# Patient Record
Sex: FEMALE | Race: WHITE | Employment: UNEMPLOYED | URBAN - METROPOLITAN AREA
[De-identification: names, ages, dates, MRNs, and addresses within clinical notes are randomized per-mention and may not be internally consistent; named-entity substitution may affect disease eponyms.]

---
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
## 2024-11-12 ENCOUNTER — HOSPITAL ENCOUNTER (OUTPATIENT)
Age: 19
Setting detail: SPECIMEN
Discharge: HOME | End: 2024-11-12
Payer: COMMERCIAL

## 2024-11-12 ENCOUNTER — INITIAL PRENATAL (OUTPATIENT)
Dept: OBGYN | Age: 19
End: 2024-11-12
Payer: COMMERCIAL

## 2024-11-12 VITALS — BODY MASS INDEX: 21.46 KG/M2 | DIASTOLIC BLOOD PRESSURE: 73 MMHG | SYSTOLIC BLOOD PRESSURE: 107 MMHG | WEIGHT: 125 LBS

## 2024-11-12 DIAGNOSIS — Z3A.17 17 WEEKS GESTATION OF PREGNANCY: Primary | ICD-10-CM

## 2024-11-12 DIAGNOSIS — O09.30 LATE PRENATAL CARE: ICD-10-CM

## 2024-11-12 DIAGNOSIS — Z34.02 ENCOUNTER FOR SUPERVISION OF NORMAL PRIMIGRAVIDA IN SECOND TRIMESTER, ANTEPARTUM: ICD-10-CM

## 2024-11-12 PROCEDURE — 87491 CHLMYD TRACH DNA AMP PROBE: CPT

## 2024-11-12 PROCEDURE — 99213 OFFICE O/P EST LOW 20 MIN: CPT | Performed by: NURSE PRACTITIONER

## 2024-11-12 PROCEDURE — G8427 DOCREV CUR MEDS BY ELIG CLIN: HCPCS | Performed by: NURSE PRACTITIONER

## 2024-11-12 PROCEDURE — G0123 SCREEN CERV/VAG THIN LAYER: HCPCS

## 2024-11-12 PROCEDURE — 36415 COLL VENOUS BLD VENIPUNCTURE: CPT | Performed by: NURSE PRACTITIONER

## 2024-11-12 PROCEDURE — G8420 CALC BMI NORM PARAMETERS: HCPCS | Performed by: NURSE PRACTITIONER

## 2024-11-12 PROCEDURE — 4004F PT TOBACCO SCREEN RCVD TLK: CPT | Performed by: NURSE PRACTITIONER

## 2024-11-12 PROCEDURE — G8484 FLU IMMUNIZE NO ADMIN: HCPCS | Performed by: NURSE PRACTITIONER

## 2024-11-12 PROCEDURE — 87591 N.GONORRHOEAE DNA AMP PROB: CPT

## 2024-11-12 ASSESSMENT — ENCOUNTER SYMPTOMS
DIARRHEA: 0
NAUSEA: 0
CONSTIPATION: 0
RESPIRATORY NEGATIVE: 1
ABDOMINAL PAIN: 0
VOMITING: 0
GASTROINTESTINAL NEGATIVE: 1
SHORTNESS OF BREATH: 0

## 2024-11-12 NOTE — PROGRESS NOTES
Memory: Cognition normal.         Judgment: Judgment normal.         No results found for this visit on 11/12/24.    ASSESSMENT AND PLAN    Prenatal info given, hx and poc reviewed. Pap, cultures, labs,  Panorama and Carrier Screens collected.   Pt to continue PNV QD, Tylenol sparingly/prn.   Bldg/pain precautions reviewed.   Anatomy u/s scheduled.   Fu vaccine declined        No follow-ups on file.    Tanesha Cabezas, APRN - CNP

## 2024-11-13 LAB
ABO + RH BLD: NORMAL
BASOPHILS # BLD: 0 K/UL (ref 0–0.2)
BASOPHILS NFR BLD: 0.3 %
BLD GP AB SCN SERPL QL: NORMAL
DEPRECATED RDW RBC AUTO: 12.9 % (ref 12.4–15.4)
EOSINOPHIL # BLD: 0.3 K/UL (ref 0–0.6)
EOSINOPHIL NFR BLD: 2.8 %
HBV SURFACE AG SERPL QL IA: ABNORMAL
HCT VFR BLD AUTO: 38.1 % (ref 36–48)
HGB BLD-MCNC: 12.9 G/DL (ref 12–16)
HIV 1+2 AB+HIV1 P24 AG SERPL QL IA: NORMAL
HIV 2 AB SERPL QL IA: NORMAL
HIV1 AB SERPL QL IA: NORMAL
HIV1 P24 AG SERPL QL IA: NORMAL
LYMPHOCYTES # BLD: 1.8 K/UL (ref 1–5.1)
LYMPHOCYTES NFR BLD: 15.7 %
MCH RBC QN AUTO: 32.5 PG (ref 26–34)
MCHC RBC AUTO-ENTMCNC: 33.9 G/DL (ref 31–36)
MCV RBC AUTO: 95.9 FL (ref 80–100)
MONOCYTES # BLD: 0.5 K/UL (ref 0–1.3)
MONOCYTES NFR BLD: 4.5 %
NEUTROPHILS # BLD: 9 K/UL (ref 1.7–7.7)
NEUTROPHILS NFR BLD: 76.7 %
PLATELET # BLD AUTO: 341 K/UL (ref 135–450)
PMV BLD AUTO: 8 FL (ref 5–10.5)
RBC # BLD AUTO: 3.97 M/UL (ref 4–5.2)
REAGIN+T PALLIDUM IGG+IGM SERPL-IMP: ABNORMAL
RUBV IGG SERPL IA-ACNC: 61.7 IU/ML
WBC # BLD AUTO: 11.8 K/UL (ref 4–11)

## 2024-11-14 LAB
BACTERIA UR CULT: NORMAL
C TRACH SPEC QL CULT: NEGATIVE
HCV RNA SERPL NAA+PROBE-ACNC: NOT DETECTED IU/ML
HCV RNA SERPL NAA+PROBE-LOG IU: NOT DETECTED LOG IU/ML
HCV RNA SERPL QL NAA+PROBE: NOT DETECTED
NEISSERIA GONORRHOEAE, CULTURE: NEGATIVE

## 2024-11-15 LAB
COMMENT: NORMAL
HPV OTHER HR TYPES: NORMAL
HPV TYPE 16: NORMAL
HPV TYPE 18: NORMAL

## 2024-11-18 LAB
GYNECOLOGY CYTOLOGY REPORT: NORMAL
Lab: NORMAL
NTRA 22Q11.2 DELETION SYNDROME POPULATION-BASED RISK TEXT: NORMAL
NTRA 22Q11.2 DELETION SYNDROME RESULT TEXT: NORMAL
NTRA 22Q11.2 DELETION SYNDROME RISK SCORE TEXT: NORMAL
NTRA FETAL FRACTION: NORMAL
NTRA FETAL RHD SUMMARY: NORMAL
NTRA GENDER OF FETUS: NORMAL
NTRA MONOSOMY X AGE-BASED RISK TEXT: NORMAL
NTRA MONOSOMY X RESULT TEXT: NORMAL
NTRA MONOSOMY X RISK SCORE TEXT: NORMAL
NTRA TRIPLOIDY RESULT TEXT: NORMAL
NTRA TRISOMY 13 AGE-BASED RISK TEXT: NORMAL
NTRA TRISOMY 13 RESULT TEXT: NORMAL
NTRA TRISOMY 13 RISK SCORE TEXT: NORMAL
NTRA TRISOMY 18 AGE-BASED RISK TEXT: NORMAL
NTRA TRISOMY 18 RESULT TEXT: NORMAL
NTRA TRISOMY 18 RISK SCORE TEXT: NORMAL
NTRA TRISOMY 21 AGE-BASED RISK TEXT: NORMAL
NTRA TRISOMY 21 RESULT TEXT: NORMAL
NTRA TRISOMY 21 RISK SCORE TEXT: NORMAL

## 2024-11-19 LAB
COMMENT: ABNORMAL
HPV OTHER HR TYPES: DETECTED
HPV TYPE 16: NOT DETECTED
HPV TYPE 18: NOT DETECTED

## 2024-11-20 LAB
Lab: NEGATIVE
Lab: NORMAL
NTRA ALPHA-THALASSEMIA: NEGATIVE
NTRA BETA-HEMOGLOBINOPATHIES: NEGATIVE
NTRA CANAVAN DISEASE: NEGATIVE
NTRA CYSTIC FIBROSIS: NEGATIVE
NTRA DUCHENNE/BECKER MUSCULAR DYSTROPHY: NEGATIVE
NTRA FAMILIAL DYSAUTONOMIA: NEGATIVE
NTRA FRAGILE X SYNDROME: NEGATIVE
NTRA GALACTOSEMIA: NEGATIVE
NTRA GAUCHER DISEASE: NEGATIVE
NTRA MEDIUM CHAIN ACYL-COA DEHYDROGENASE DEFICIENCY: NEGATIVE
NTRA POLYCYSTIC KIDNEY DISEASE, AUTOSOMAL RECESSIVE: NEGATIVE
NTRA SMITH-LEMLI-OPITZ SYNDROME: NEGATIVE
NTRA SPINAL MUSCULAR ATROPHY: NEGATIVE
NTRA TAY-SACHS DISEASE: NEGATIVE

## 2024-12-10 ENCOUNTER — ROUTINE PRENATAL (OUTPATIENT)
Dept: OBGYN | Age: 19
End: 2024-12-10
Payer: COMMERCIAL

## 2024-12-10 VITALS
HEIGHT: 64 IN | BODY MASS INDEX: 23.05 KG/M2 | WEIGHT: 135 LBS | DIASTOLIC BLOOD PRESSURE: 63 MMHG | SYSTOLIC BLOOD PRESSURE: 97 MMHG

## 2024-12-10 DIAGNOSIS — Z3A.21 21 WEEKS GESTATION OF PREGNANCY: Primary | ICD-10-CM

## 2024-12-10 DIAGNOSIS — O09.30 LATE PRENATAL CARE: ICD-10-CM

## 2024-12-10 DIAGNOSIS — Z34.02 ENCOUNTER FOR SUPERVISION OF NORMAL PRIMIGRAVIDA IN SECOND TRIMESTER, ANTEPARTUM: ICD-10-CM

## 2024-12-10 PROCEDURE — 99213 OFFICE O/P EST LOW 20 MIN: CPT | Performed by: NURSE PRACTITIONER

## 2024-12-10 PROCEDURE — G8484 FLU IMMUNIZE NO ADMIN: HCPCS | Performed by: NURSE PRACTITIONER

## 2024-12-10 PROCEDURE — 4004F PT TOBACCO SCREEN RCVD TLK: CPT | Performed by: NURSE PRACTITIONER

## 2024-12-10 PROCEDURE — G8427 DOCREV CUR MEDS BY ELIG CLIN: HCPCS | Performed by: NURSE PRACTITIONER

## 2024-12-10 PROCEDURE — G8420 CALC BMI NORM PARAMETERS: HCPCS | Performed by: NURSE PRACTITIONER

## 2024-12-10 SDOH — ECONOMIC STABILITY: FOOD INSECURITY: WITHIN THE PAST 12 MONTHS, YOU WORRIED THAT YOUR FOOD WOULD RUN OUT BEFORE YOU GOT MONEY TO BUY MORE.: NEVER TRUE

## 2024-12-10 SDOH — ECONOMIC STABILITY: INCOME INSECURITY: HOW HARD IS IT FOR YOU TO PAY FOR THE VERY BASICS LIKE FOOD, HOUSING, MEDICAL CARE, AND HEATING?: NOT HARD AT ALL

## 2024-12-10 SDOH — ECONOMIC STABILITY: FOOD INSECURITY: WITHIN THE PAST 12 MONTHS, THE FOOD YOU BOUGHT JUST DIDN'T LAST AND YOU DIDN'T HAVE MONEY TO GET MORE.: NEVER TRUE

## 2024-12-10 ASSESSMENT — PATIENT HEALTH QUESTIONNAIRE - PHQ9
SUM OF ALL RESPONSES TO PHQ QUESTIONS 1-9: 0
1. LITTLE INTEREST OR PLEASURE IN DOING THINGS: NOT AT ALL
SUM OF ALL RESPONSES TO PHQ QUESTIONS 1-9: 0
SUM OF ALL RESPONSES TO PHQ QUESTIONS 1-9: 0
2. FEELING DOWN, DEPRESSED OR HOPELESS: NOT AT ALL
SUM OF ALL RESPONSES TO PHQ QUESTIONS 1-9: 0
SUM OF ALL RESPONSES TO PHQ9 QUESTIONS 1 & 2: 0

## 2024-12-10 NOTE — PROGRESS NOTES
Return OB Office Visit    CC:   Chief Complaint   Patient presents with    Routine Prenatal Visit     C/o lower back pain that comes and goes, describes as sharp/muscle pain x 2024.   +fm. Is taking pnv.        HPI:  Patient seen and examined. Concerns/complaints; intermittent/mild R flank pain. Denies VB, LOF, ctx. +Fm.  Denies headaches, vision changes, RUQ pain, increased LE edema.  Denies chest pain, shortness of breath, fever, chills, nausea, vomiting.      Review of Systems: The following ROS was otherwise negative, except as noted in the HPI: constitutional, HEENT, respiratory, cardiovascular, gastrointestinal, genitourinary, skin, musculoskeletal, neurological, psych    Objective:  BP 97/63 (Site: Left Upper Arm, Position: Sitting, Cuff Size: Medium Adult)   Ht 1.626 m (5' 4\")   Wt 61.2 kg (135 lb)   LMP 2024   BMI 23.17 kg/m²     Physical Exam  Vitals and nursing note reviewed.   Constitutional:       Appearance: Normal appearance.   HENT:      Head: Normocephalic.   Pulmonary:      Effort: Pulmonary effort is normal.   Musculoskeletal:         General: Normal range of motion.      Cervical back: Normal range of motion.   Skin:     General: Skin is warm and dry.   Neurological:      Mental Status: She is alert.   Psychiatric:         Mood and Affect: Mood normal.         Gravid, non tender, no flank pain    FHR: + by doppler    Assessment/Plan:   Kaylah Beltre is a 19 y.o.  at 21w0d who presents for routine OB visit     Diagnosis Orders   1. 21 weeks gestation of pregnancy        2. Encounter for supervision of normal primigravida in second trimester, antepartum        3. Late prenatal care            Monitor Flank pain; report persistent or increased intensity of pain, fever, dysuria, n/v, diarrhea/constipation or vaginal bldg    Anatomy u/s tomorrow     All labs up to date and reviewed.   PTL s/s and FM patterns reviewed.   Continue PNV QD.    Return in about 1 day (around

## 2025-01-09 ENCOUNTER — ROUTINE PRENATAL (OUTPATIENT)
Dept: OBGYN | Age: 20
End: 2025-01-09
Payer: COMMERCIAL

## 2025-01-09 VITALS
BODY MASS INDEX: 24.07 KG/M2 | WEIGHT: 141 LBS | HEIGHT: 64 IN | HEART RATE: 76 BPM | DIASTOLIC BLOOD PRESSURE: 73 MMHG | SYSTOLIC BLOOD PRESSURE: 115 MMHG

## 2025-01-09 DIAGNOSIS — R87.810 ASCUS WITH POSITIVE HIGH RISK HPV CERVICAL: ICD-10-CM

## 2025-01-09 DIAGNOSIS — R87.610 ASCUS WITH POSITIVE HIGH RISK HPV CERVICAL: ICD-10-CM

## 2025-01-09 DIAGNOSIS — Z34.02 ENCOUNTER FOR SUPERVISION OF NORMAL PRIMIGRAVIDA IN SECOND TRIMESTER, ANTEPARTUM: ICD-10-CM

## 2025-01-09 DIAGNOSIS — Z3A.25 25 WEEKS GESTATION OF PREGNANCY: Primary | ICD-10-CM

## 2025-01-09 PROCEDURE — 99213 OFFICE O/P EST LOW 20 MIN: CPT

## 2025-01-09 PROCEDURE — 1036F TOBACCO NON-USER: CPT

## 2025-01-09 PROCEDURE — 36415 COLL VENOUS BLD VENIPUNCTURE: CPT

## 2025-01-09 PROCEDURE — G8427 DOCREV CUR MEDS BY ELIG CLIN: HCPCS

## 2025-01-09 PROCEDURE — G8420 CALC BMI NORM PARAMETERS: HCPCS

## 2025-01-09 SDOH — ECONOMIC STABILITY: FOOD INSECURITY: WITHIN THE PAST 12 MONTHS, THE FOOD YOU BOUGHT JUST DIDN'T LAST AND YOU DIDN'T HAVE MONEY TO GET MORE.: NEVER TRUE

## 2025-01-09 SDOH — ECONOMIC STABILITY: FOOD INSECURITY: WITHIN THE PAST 12 MONTHS, YOU WORRIED THAT YOUR FOOD WOULD RUN OUT BEFORE YOU GOT MONEY TO BUY MORE.: NEVER TRUE

## 2025-01-09 ASSESSMENT — PATIENT HEALTH QUESTIONNAIRE - PHQ9
SUM OF ALL RESPONSES TO PHQ QUESTIONS 1-9: 0
SUM OF ALL RESPONSES TO PHQ QUESTIONS 1-9: 0
SUM OF ALL RESPONSES TO PHQ9 QUESTIONS 1 & 2: 0
1. LITTLE INTEREST OR PLEASURE IN DOING THINGS: NOT AT ALL
SUM OF ALL RESPONSES TO PHQ QUESTIONS 1-9: 0
SUM OF ALL RESPONSES TO PHQ QUESTIONS 1-9: 0
2. FEELING DOWN, DEPRESSED OR HOPELESS: NOT AT ALL

## 2025-01-09 NOTE — PROGRESS NOTES
Return OB Office Visit    CC:   Chief Complaint   Patient presents with    Routine Prenatal Visit     1 hr gtt today. No c/o. Is taking pnv.        HPI:  Patient seen and examined. No concerns/complaints. Denies VB, LOF, ctx. +Fm.  Denies headaches, vision changes, RUQ pain, increased LE edema.  Denies chest pain, shortness of breath, fever, chills, nausea, vomiting.      Review of Systems: The following ROS was otherwise negative, except as noted in the HPI: constitutional, HEENT, respiratory, cardiovascular, gastrointestinal, genitourinary, skin, musculoskeletal, neurological, psych    Objective:  /73   Pulse 76   Ht 1.626 m (5' 4\")   Wt 64 kg (141 lb)   LMP 2024   BMI 24.20 kg/m²     Physical Exam  Vitals and nursing note reviewed.   Constitutional:       Appearance: Normal appearance.   HENT:      Head: Normocephalic.   Pulmonary:      Effort: Pulmonary effort is normal.   Musculoskeletal:         General: Normal range of motion.      Cervical back: Normal range of motion.   Skin:     General: Skin is warm and dry.   Neurological:      Mental Status: She is alert.   Psychiatric:         Mood and Affect: Mood normal.         Gravid, non tender, no flank pain    FHR: + by doppler    Assessment/Plan:   Kaylah Beltre is a 19 y.o.  at 25w2d who presents for routine OB visit     Diagnosis Orders   1. 25 weeks gestation of pregnancy  CBC    Syphilis Antibody Cascading Reflex    Glucose Challenge Gestational      2. Encounter for supervision of normal primigravida in second trimester, antepartum  CBC    Syphilis Antibody Cascading Reflex    Glucose Challenge Gestational      3. ASCUS with positive high risk HPV cervical          Continue PNV. Report pelvic pain, bleeding, lof, fevers, dysuria. Tylenol sparingly.     Discussed abnormal pap, will schedule for colposcopy.     Return in about 4 weeks (around 2025).    Tess Muse PA-C

## 2025-01-10 LAB
DEPRECATED RDW RBC AUTO: 12.5 % (ref 12.4–15.4)
GLUCOSE 1H P 50 G GLC PO SERPL-MCNC: 65 MG/DL
HCT VFR BLD AUTO: 36.6 % (ref 36–48)
HGB BLD-MCNC: 12.3 G/DL (ref 12–16)
MCH RBC QN AUTO: 32.3 PG (ref 26–34)
MCHC RBC AUTO-ENTMCNC: 33.6 G/DL (ref 31–36)
MCV RBC AUTO: 96 FL (ref 80–100)
PLATELET # BLD AUTO: 373 K/UL (ref 135–450)
PMV BLD AUTO: 7.9 FL (ref 5–10.5)
RBC # BLD AUTO: 3.81 M/UL (ref 4–5.2)
REAGIN+T PALLIDUM IGG+IGM SERPL-IMP: NORMAL
WBC # BLD AUTO: 12.3 K/UL (ref 4–11)

## 2025-01-27 ENCOUNTER — PROCEDURE VISIT (OUTPATIENT)
Dept: OBGYN | Age: 20
End: 2025-01-27
Payer: COMMERCIAL

## 2025-01-27 VITALS
BODY MASS INDEX: 25.1 KG/M2 | DIASTOLIC BLOOD PRESSURE: 71 MMHG | HEART RATE: 86 BPM | WEIGHT: 147 LBS | HEIGHT: 64 IN | SYSTOLIC BLOOD PRESSURE: 110 MMHG

## 2025-01-27 DIAGNOSIS — Z3A.27 27 WEEKS GESTATION OF PREGNANCY: ICD-10-CM

## 2025-01-27 DIAGNOSIS — R87.610 ASCUS WITH POSITIVE HIGH RISK HPV CERVICAL: Primary | ICD-10-CM

## 2025-01-27 DIAGNOSIS — Z34.02 ENCOUNTER FOR SUPERVISION OF NORMAL PRIMIGRAVIDA IN SECOND TRIMESTER, ANTEPARTUM: ICD-10-CM

## 2025-01-27 DIAGNOSIS — R87.810 ASCUS WITH POSITIVE HIGH RISK HPV CERVICAL: Primary | ICD-10-CM

## 2025-01-27 PROCEDURE — 57452 EXAM OF CERVIX W/SCOPE: CPT | Performed by: OBSTETRICS & GYNECOLOGY

## 2025-01-27 PROCEDURE — G8419 CALC BMI OUT NRM PARAM NOF/U: HCPCS | Performed by: OBSTETRICS & GYNECOLOGY

## 2025-01-27 PROCEDURE — 99213 OFFICE O/P EST LOW 20 MIN: CPT | Performed by: OBSTETRICS & GYNECOLOGY

## 2025-01-27 PROCEDURE — G8427 DOCREV CUR MEDS BY ELIG CLIN: HCPCS | Performed by: OBSTETRICS & GYNECOLOGY

## 2025-01-27 PROCEDURE — 1036F TOBACCO NON-USER: CPT | Performed by: OBSTETRICS & GYNECOLOGY

## 2025-01-27 SDOH — ECONOMIC STABILITY: FOOD INSECURITY: WITHIN THE PAST 12 MONTHS, YOU WORRIED THAT YOUR FOOD WOULD RUN OUT BEFORE YOU GOT MONEY TO BUY MORE.: NEVER TRUE

## 2025-01-27 SDOH — ECONOMIC STABILITY: FOOD INSECURITY: WITHIN THE PAST 12 MONTHS, THE FOOD YOU BOUGHT JUST DIDN'T LAST AND YOU DIDN'T HAVE MONEY TO GET MORE.: NEVER TRUE

## 2025-01-27 NOTE — PROGRESS NOTES
Return OB Office Visit    CC:   Chief Complaint   Patient presents with    Procedure     Pt here for colposcopy, abnormal pap 2024 Rare atypical squamous cells of undetermined significance HPV other detected. Consent signed.        HPI:  Patient seen and examined. No concerns/complaints. Denies VB, LOF, ctx. +Fm.  Denies headaches, vision changes, RUQ pain, increased LE edema.  Denies chest pain, shortness of breath, fever, chills, nausea, vomiting.      Review of Systems: The following ROS was otherwise negative, except as noted in the HPI: constitutional, HEENT, respiratory, cardiovascular, gastrointestinal, genitourinary, skin, musculoskeletal, neurological, psych    Objective:  /71 (Site: Right Upper Arm, Position: Sitting, Cuff Size: Medium Adult)   Pulse 86   Ht 1.626 m (5' 4\")   Wt 66.7 kg (147 lb)   LMP 2024   BMI 25.23 kg/m²     Physical Exam    Gravid, non tender, no flank pain    FHR: + by doppler    Cervical exam:  cervical exam not performed today    Assessment/Plan:   Kaylah Beltre is a 19 y.o.  at 27w6d who presents for routine OB visit     Diagnosis Orders   1. ASCUS with positive high risk HPV cervical        2. Encounter for supervision of normal primigravida in second trimester, antepartum        3. 27 weeks gestation of pregnancy            Data Unavailable     Chaperone Mónica Avery was present for the entire appointment.      The patient will monitor for loss of fluid or vaginal bleeding.  If age-appropriate she will monitor for fetal movement.  If she is having more than 4-6 contractions an hour she will present to labor and delivery.  She will continue taking her prenatal vitamins as prescribed.  All up-to-date prenatal labs and imaging were reviewed and discussed with patient.    Disposition:   colpo today    No follow-ups on file.    Dionicio Martin MD       Colposcopy Procedure Note    Indications: Pt presents for colposcopy secondary to ASCUS and

## 2025-01-30 ENCOUNTER — ROUTINE PRENATAL (OUTPATIENT)
Dept: OBGYN | Age: 20
End: 2025-01-30
Payer: COMMERCIAL

## 2025-01-30 VITALS
DIASTOLIC BLOOD PRESSURE: 69 MMHG | WEIGHT: 146 LBS | TEMPERATURE: 98.1 F | SYSTOLIC BLOOD PRESSURE: 108 MMHG | BODY MASS INDEX: 24.92 KG/M2 | HEIGHT: 64 IN

## 2025-01-30 DIAGNOSIS — Z34.02 ENCOUNTER FOR SUPERVISION OF NORMAL PRIMIGRAVIDA IN SECOND TRIMESTER, ANTEPARTUM: Primary | ICD-10-CM

## 2025-01-30 DIAGNOSIS — Z3A.28 28 WEEKS GESTATION OF PREGNANCY: ICD-10-CM

## 2025-01-30 PROCEDURE — G8419 CALC BMI OUT NRM PARAM NOF/U: HCPCS | Performed by: OBSTETRICS & GYNECOLOGY

## 2025-01-30 PROCEDURE — 99213 OFFICE O/P EST LOW 20 MIN: CPT | Performed by: OBSTETRICS & GYNECOLOGY

## 2025-01-30 PROCEDURE — 1036F TOBACCO NON-USER: CPT | Performed by: OBSTETRICS & GYNECOLOGY

## 2025-01-30 PROCEDURE — G8427 DOCREV CUR MEDS BY ELIG CLIN: HCPCS | Performed by: OBSTETRICS & GYNECOLOGY

## 2025-01-30 NOTE — PROGRESS NOTES
Return OB Office Visit    CC:   Chief Complaint   Patient presents with    Routine Prenatal Visit     +fm. Is taking pnv.   C/o filiberto muir with sharp shooting pelvic pain yesterday.   C/o diarrhea, and felt feverish did not check, back pain ,and RUQ pain.          HPI:  Patient seen and examined. No concerns/complaints. Denies VB, LOF, ctx. +Fm.  Denies headaches, vision changes, RUQ pain, increased LE edema.  Denies chest pain, shortness of breath, fever, chills, nausea, vomiting.      Review of Systems: The following ROS was otherwise negative, except as noted in the HPI: constitutional, HEENT, respiratory, cardiovascular, gastrointestinal, genitourinary, skin, musculoskeletal, neurological, psych    Objective:  /69   Temp 98.1 °F (36.7 °C)   Ht 1.626 m (5' 4\")   Wt 66.2 kg (146 lb)   LMP 2024   BMI 25.06 kg/m²     Physical Exam    Gravid, non tender, no flank pain    Fundal Height:  normal    FHR: + by doppler    Cervix: cervical exam not performed today    Assessment/Plan:   Kaylah Beltre is a 19 y.o.  at 28w2d who presents for routine OB visit     Diagnosis Orders   1. Encounter for supervision of normal primigravida in second trimester, antepartum        2. 28 weeks gestation of pregnancy            Data Unavailable     Chaperone  was present for the entire appointment.      All up-to-date obstetric labwork and imaging reviewed for today's encounter.     Patient was instructed to watch for vaginal bleeding or loss of fluid.  She will call with increasing contractions.  Fetal kick counts were discussed.  She will maintain her prenatal vitamin every day.  Any questions were answered.    Disposition:   Patient does not appear ill at this time.  She is afebrile with stable vital signs.  Discussed that if she continues to feel sick she will use an over-the-counter COVID test otherwise follow-up in 1 week in the office.    No follow-ups on file.    Dionicio Martin MD

## 2025-02-06 ENCOUNTER — ROUTINE PRENATAL (OUTPATIENT)
Dept: OBGYN | Age: 20
End: 2025-02-06

## 2025-02-06 VITALS — SYSTOLIC BLOOD PRESSURE: 102 MMHG | DIASTOLIC BLOOD PRESSURE: 66 MMHG | WEIGHT: 149 LBS | BODY MASS INDEX: 25.58 KG/M2

## 2025-02-06 DIAGNOSIS — Z3A.29 29 WEEKS GESTATION OF PREGNANCY: Primary | ICD-10-CM

## 2025-02-06 DIAGNOSIS — Z34.03 ENCOUNTER FOR SUPERVISION OF NORMAL PRIMIGRAVIDA IN THIRD TRIMESTER, ANTEPARTUM: ICD-10-CM

## 2025-02-06 NOTE — PROGRESS NOTES
Return OB Office Visit    CC:   Chief Complaint   Patient presents with    Routine Prenatal Visit     No c/o.        HPI:  Patient seen and examined. No concerns/complaints. Denies VB, LOF, ctx. +Fm.  Denies headaches, vision changes, RUQ pain, increased LE edema.  Denies chest pain, shortness of breath, fever, chills, nausea, vomiting.      Review of Systems: The following ROS was otherwise negative, except as noted in the HPI: constitutional, HEENT, respiratory, cardiovascular, gastrointestinal, genitourinary, skin, musculoskeletal, neurological, psych    Objective:  /66   Wt 67.6 kg (149 lb)   LMP 2024   BMI 25.58 kg/m²     Physical Exam  Vitals and nursing note reviewed.   Constitutional:       Appearance: Normal appearance.   HENT:      Head: Normocephalic.   Pulmonary:      Effort: Pulmonary effort is normal.   Musculoskeletal:         General: Normal range of motion.      Cervical back: Normal range of motion.   Skin:     General: Skin is warm and dry.   Neurological:      Mental Status: She is alert.   Psychiatric:         Mood and Affect: Mood normal.       Gravid, non tender, no flank pain    FHR: + by doppler    Assessment/Plan:   Kaylah Beltre is a 19 y.o.  at 29w2d who presents for routine OB visit     Diagnosis Orders   1. 29 weeks gestation of pregnancy        2. Encounter for supervision of normal primigravida in third trimester, antepartum          Continue PNV. Fetal kick counts reviewed. PTL s/s reviewed. To L&D for decreased fm, gush of fluid, or 4+ contractions in 1 hour. Tylenol sparingly.     Discussed colp, per ASCCP guidelines plan to repeat pap in 1 year.    Return in about 2 weeks (around 2025).    Tess Muse PA-C

## 2025-02-20 ENCOUNTER — ROUTINE PRENATAL (OUTPATIENT)
Dept: OBGYN | Age: 20
End: 2025-02-20
Payer: COMMERCIAL

## 2025-02-20 VITALS
HEART RATE: 94 BPM | SYSTOLIC BLOOD PRESSURE: 107 MMHG | WEIGHT: 151.6 LBS | DIASTOLIC BLOOD PRESSURE: 66 MMHG | BODY MASS INDEX: 26.02 KG/M2

## 2025-02-20 DIAGNOSIS — Z34.03 ENCOUNTER FOR SUPERVISION OF NORMAL PRIMIGRAVIDA IN THIRD TRIMESTER, ANTEPARTUM: Primary | ICD-10-CM

## 2025-02-20 DIAGNOSIS — Z3A.31 31 WEEKS GESTATION OF PREGNANCY: ICD-10-CM

## 2025-02-20 PROCEDURE — 1036F TOBACCO NON-USER: CPT | Performed by: OBSTETRICS & GYNECOLOGY

## 2025-02-20 PROCEDURE — G8419 CALC BMI OUT NRM PARAM NOF/U: HCPCS | Performed by: OBSTETRICS & GYNECOLOGY

## 2025-02-20 PROCEDURE — 99213 OFFICE O/P EST LOW 20 MIN: CPT | Performed by: OBSTETRICS & GYNECOLOGY

## 2025-02-20 PROCEDURE — G8427 DOCREV CUR MEDS BY ELIG CLIN: HCPCS | Performed by: OBSTETRICS & GYNECOLOGY

## 2025-02-20 NOTE — PROGRESS NOTES
Return OB Office Visit    CC:   Chief Complaint   Patient presents with    Routine Prenatal Visit     Pt pnv, fetal movement daily, pt c/o pain and pressure in groin.        HPI:  Patient seen and examined. No concerns/complaints. Denies VB, LOF, ctx. +Fm.  Denies headaches, vision changes, RUQ pain, increased LE edema.  Denies chest pain, shortness of breath, fever, chills, nausea, vomiting.      Review of Systems: The following ROS was otherwise negative, except as noted in the HPI: constitutional, HEENT, respiratory, cardiovascular, gastrointestinal, genitourinary, skin, musculoskeletal, neurological, psych    Objective:  /66   Pulse 94   Wt 68.8 kg (151 lb 9.6 oz)   LMP 2024   BMI 26.02 kg/m²     Gravid, non tender, no flank pain    FHR: +by doppler    Assessment/Plan:   Kaylah Beltre is a 19 y.o.  at 31w2d who presents for routine OB visit     Diagnosis Orders   1. Encounter for supervision of normal primigravida in third trimester, antepartum        2. 31 weeks gestation of pregnancy            Return in about 2 weeks (around 3/6/2025).    All OB labs and imaging reviewed  Vitamins for the duration of pregnancy  Fkcs  Ptl precautions    Severo Kurtz MD

## 2025-02-25 ENCOUNTER — HOSPITAL ENCOUNTER (OUTPATIENT)
Age: 20
Discharge: HOME | End: 2025-02-25
Attending: OBSTETRICS & GYNECOLOGY | Admitting: OBSTETRICS & GYNECOLOGY
Payer: COMMERCIAL

## 2025-02-25 VITALS
HEIGHT: 64 IN | WEIGHT: 152 LBS | SYSTOLIC BLOOD PRESSURE: 107 MMHG | DIASTOLIC BLOOD PRESSURE: 55 MMHG | RESPIRATION RATE: 12 BRPM | OXYGEN SATURATION: 97 % | HEART RATE: 79 BPM | BODY MASS INDEX: 25.95 KG/M2 | TEMPERATURE: 98.3 F

## 2025-02-25 PROBLEM — N89.8 VAGINAL DISCHARGE: Status: ACTIVE | Noted: 2025-02-25

## 2025-02-25 PROCEDURE — 84112 EVAL AMNIOTIC FLUID PROTEIN: CPT

## 2025-02-25 PROCEDURE — 99212 OFFICE O/P EST SF 10 MIN: CPT

## 2025-02-25 PROCEDURE — APPNB30 APP NON BILLABLE TIME 0-30 MINS: Performed by: NURSE PRACTITIONER

## 2025-02-25 NOTE — PROGRESS NOTES
Department of Obstetrics and Gynecology  Labor and Delivery  TRIAGE NOTE      SUBJECTIVE:    Chief Complaint   Patient presents with    Vaginal Discharge     Pt presents stating when she pulled down her underwear she noticed it \" looked wet\", see pic from pt communication.  Denies feeling any leaking, endorses good FM, no other concerns.      OBJECTIVE    Vitals:  BP (!) 107/55   Pulse 79   Temp 98.3 °F (36.8 °C) (Oral)   Resp 12   Ht 1.626 m (5' 4\")   Wt 68.9 kg (152 lb)   LMP 2024   SpO2 97%   BMI 26.09 kg/m²       CONSTITUTIONAL:  negative  RESPIRATORY:  negative  CARDIOVASCULAR:  negative  GASTROINTESTINAL:  negative  ALLERGIC/IMMUNOLOGIC:  negative  NEUROLOGICAL:  negative  BEHAVIOR/PSYCH:  negative      Membranes:    AMNISURE NEG , No fluid noted coming from vagina, pads dry     Fetal heart rate:        Baseline FHR :    130 bpm              Fetal Accelerations:  present        Fetal Decelerations:  absent        Fetal Variability:   moderate    Contraction frequency:  0 minutes    DATA:  Lab Results   Component Value Date    WBC 12.3 (H) 2025    HGB 12.3 2025    HCT 36.6 2025    MCV 96.0 2025     2025         ASSESSMENT:     19 y.o.  year old  at 32w0d  with 2025, by Last Menstrual Period  Normal discharge of pregnancy   CAT 1 FHT         PLAN:  Discussed return precautions  Kick counts reviewed        I have collaborated and updated Dr Martin  and the MD agrees with the current POC.       ARCHIE Duarte - MAYLIN

## 2025-02-25 NOTE — FLOWSHEET NOTE
Patient arrives ambulatory to the birthing center with complaints of watery discharge. Patient escorted to TRI04, instructed to change into gown, obtain a urine specimen, and oriented to room and call light.

## 2025-02-25 NOTE — FLOWSHEET NOTE
EFM and TOCO applied.     Patient reports fetal movement, denies vaginal bleeding, reports small amount of fluid in underwear, and denies abdominal pain. Patient's abdomen is soft and non tender on palpation.

## 2025-03-06 ENCOUNTER — ROUTINE PRENATAL (OUTPATIENT)
Dept: OBGYN | Age: 20
End: 2025-03-06
Payer: COMMERCIAL

## 2025-03-06 VITALS
DIASTOLIC BLOOD PRESSURE: 62 MMHG | HEART RATE: 83 BPM | WEIGHT: 153 LBS | SYSTOLIC BLOOD PRESSURE: 106 MMHG | BODY MASS INDEX: 26.26 KG/M2

## 2025-03-06 DIAGNOSIS — Z34.03 ENCOUNTER FOR SUPERVISION OF NORMAL FIRST PREGNANCY IN THIRD TRIMESTER: ICD-10-CM

## 2025-03-06 DIAGNOSIS — Z3A.33 33 WEEKS GESTATION OF PREGNANCY: Primary | ICD-10-CM

## 2025-03-06 PROCEDURE — G8427 DOCREV CUR MEDS BY ELIG CLIN: HCPCS | Performed by: OBSTETRICS & GYNECOLOGY

## 2025-03-06 PROCEDURE — G8419 CALC BMI OUT NRM PARAM NOF/U: HCPCS | Performed by: OBSTETRICS & GYNECOLOGY

## 2025-03-06 PROCEDURE — 90471 IMMUNIZATION ADMIN: CPT | Performed by: OBSTETRICS & GYNECOLOGY

## 2025-03-06 PROCEDURE — 90715 TDAP VACCINE 7 YRS/> IM: CPT | Performed by: OBSTETRICS & GYNECOLOGY

## 2025-03-06 PROCEDURE — 1036F TOBACCO NON-USER: CPT | Performed by: OBSTETRICS & GYNECOLOGY

## 2025-03-06 PROCEDURE — 99213 OFFICE O/P EST LOW 20 MIN: CPT | Performed by: OBSTETRICS & GYNECOLOGY

## 2025-03-06 NOTE — PROGRESS NOTES
Return OB Office Visit    CC:   Chief Complaint   Patient presents with    Routine Prenatal Visit     Tdap today, no c/o. +fm, taking pnv.        HPI:  Patient seen and examined. No concerns/complaints. Denies VB, LOF, ctx. +Fm.  Denies headaches, vision changes, RUQ pain, increased LE edema.  Denies chest pain, shortness of breath, fever, chills, nausea, vomiting.      Review of Systems: The following ROS was otherwise negative, except as noted in the HPI: constitutional, HEENT, respiratory, cardiovascular, gastrointestinal, genitourinary, skin, musculoskeletal, neurological, psych    Objective:  /62   Pulse 83   Wt 69.4 kg (153 lb)   LMP 2024   BMI 26.26 kg/m²     Gravid, non tender, no flank pain    FHR: +by doppler    Assessment/Plan:   Kaylah Beltre is a 19 y.o.  at 33w2d who presents for routine OB visit     Diagnosis Orders   1. 33 weeks gestation of pregnancy  Tdap, BOOSTRIX, (age 10 yrs+), IM      2. Encounter for supervision of normal first pregnancy in third trimester  Tdap, BOOSTRIX, (age 10 yrs+), IM          Return in about 2 weeks (around 3/20/2025).    All OB labs and imaging reviewed  Vitamins for the duration of pregnancy  Fkcs  Ptl  precautions    Severo Kurtz MD

## 2025-03-20 ENCOUNTER — ROUTINE PRENATAL (OUTPATIENT)
Dept: OBGYN | Age: 20
End: 2025-03-20
Payer: COMMERCIAL

## 2025-03-20 VITALS
BODY MASS INDEX: 26.95 KG/M2 | HEART RATE: 90 BPM | DIASTOLIC BLOOD PRESSURE: 70 MMHG | SYSTOLIC BLOOD PRESSURE: 109 MMHG | WEIGHT: 157 LBS

## 2025-03-20 DIAGNOSIS — Z3A.35 35 WEEKS GESTATION OF PREGNANCY: ICD-10-CM

## 2025-03-20 DIAGNOSIS — Z34.03 ENCOUNTER FOR SUPERVISION OF NORMAL FIRST PREGNANCY IN THIRD TRIMESTER: Primary | ICD-10-CM

## 2025-03-20 DIAGNOSIS — O26.843 SIZE OF FETUS INCONSISTENT WITH DATES IN THIRD TRIMESTER: ICD-10-CM

## 2025-03-20 PROCEDURE — G8419 CALC BMI OUT NRM PARAM NOF/U: HCPCS | Performed by: OBSTETRICS & GYNECOLOGY

## 2025-03-20 PROCEDURE — 99213 OFFICE O/P EST LOW 20 MIN: CPT | Performed by: OBSTETRICS & GYNECOLOGY

## 2025-03-20 PROCEDURE — G8427 DOCREV CUR MEDS BY ELIG CLIN: HCPCS | Performed by: OBSTETRICS & GYNECOLOGY

## 2025-03-20 PROCEDURE — 1036F TOBACCO NON-USER: CPT | Performed by: OBSTETRICS & GYNECOLOGY

## 2025-03-20 NOTE — PROGRESS NOTES
Return OB Office Visit    CC:   Chief Complaint   Patient presents with    Routine Prenatal Visit     +FM, taking pnv, c/o pelvic pain/back x4 days, c/o vaginal pain x1 day, c/o of excessive discharge x4 days       HPI:  Patient seen and examined. No concerns/complaints. Denies VB, LOF, ctx. +Fm.  Denies headaches, vision changes, RUQ pain, increased LE edema.  Denies chest pain, shortness of breath, fever, chills, nausea, vomiting.      Review of Systems: The following ROS was otherwise negative, except as noted in the HPI: constitutional, HEENT, respiratory, cardiovascular, gastrointestinal, genitourinary, skin, musculoskeletal, neurological, psych    Objective:  /70   Pulse 90   Wt 71.2 kg (157 lb)   LMP 2024   BMI 26.95 kg/m²     Gravid, non tender, no flank pain    FHR: +by doppler    Assessment/Plan:   Kaylah Beltre is a 19 y.o.  at 35w2d who presents for routine OB visit     Diagnosis Orders   1. Encounter for supervision of normal first pregnancy in third trimester        2. 35 weeks gestation of pregnancy        3. Size of fetus inconsistent with dates in third trimester  US PREG UTERUS FOLLOW UP TRANSABD PER FETUS          Return in about 1 week (around 3/27/2025).    All OB labs and imaging reviewed  Vitamins for the duration of pregnancy  Fkcs  Ptl precautions      Severo Kurtz MD

## 2025-03-27 ENCOUNTER — ROUTINE PRENATAL (OUTPATIENT)
Dept: OBGYN | Age: 20
End: 2025-03-27
Payer: COMMERCIAL

## 2025-03-27 VITALS
DIASTOLIC BLOOD PRESSURE: 81 MMHG | HEART RATE: 91 BPM | BODY MASS INDEX: 26.95 KG/M2 | WEIGHT: 157 LBS | SYSTOLIC BLOOD PRESSURE: 122 MMHG

## 2025-03-27 DIAGNOSIS — Z3A.36 36 WEEKS GESTATION OF PREGNANCY: ICD-10-CM

## 2025-03-27 DIAGNOSIS — Z34.03 ENCOUNTER FOR SUPERVISION OF NORMAL FIRST PREGNANCY IN THIRD TRIMESTER: Primary | ICD-10-CM

## 2025-03-27 PROCEDURE — 1036F TOBACCO NON-USER: CPT | Performed by: OBSTETRICS & GYNECOLOGY

## 2025-03-27 PROCEDURE — G8427 DOCREV CUR MEDS BY ELIG CLIN: HCPCS | Performed by: OBSTETRICS & GYNECOLOGY

## 2025-03-27 PROCEDURE — G8419 CALC BMI OUT NRM PARAM NOF/U: HCPCS | Performed by: OBSTETRICS & GYNECOLOGY

## 2025-03-27 PROCEDURE — 99213 OFFICE O/P EST LOW 20 MIN: CPT | Performed by: OBSTETRICS & GYNECOLOGY

## 2025-03-27 NOTE — PROGRESS NOTES
Return OB Office Visit    CC:   Chief Complaint   Patient presents with    Routine Prenatal Visit     Group b strep today; +fm, taking pnv. No concerns.        HPI:  Patient seen and examined. No concerns/complaints. Denies VB, LOF, ctx. +Fm.  Denies headaches, vision changes, RUQ pain, increased LE edema.  Denies chest pain, shortness of breath, fever, chills, nausea, vomiting.      Review of Systems: The following ROS was otherwise negative, except as noted in the HPI: constitutional, HEENT, respiratory, cardiovascular, gastrointestinal, genitourinary, skin, musculoskeletal, neurological, psych    Objective:  /81   Pulse 91   Wt 71.2 kg (157 lb)   LMP 2024   BMI 26.95 kg/m²     Gravid, non tender, no flank pain    FHR: +by doppler    Assessment/Plan:   Kaylah Beltre is a 19 y.o.  at 36w2d who presents for routine OB visit     Diagnosis Orders   1. Encounter for supervision of normal first pregnancy in third trimester  Culture, Strep B Screen, Vaginal/Rectal      2. 36 weeks gestation of pregnancy  Culture, Strep B Screen, Vaginal/Rectal          Return in about 1 week (around 4/3/2025).    All OB labs and imaging reviewed  Vitamins for the duration of pregnancy  Fkcs  [Ptl precautions  Labor precautions at 37 weeks    Severo Kurtz MD

## 2025-03-30 LAB — GP B STREP SPEC QL CULT: NORMAL

## 2025-04-02 ENCOUNTER — HOSPITAL ENCOUNTER (OUTPATIENT)
Age: 20
Discharge: HOME | End: 2025-04-02
Attending: OBSTETRICS & GYNECOLOGY | Admitting: OBSTETRICS & GYNECOLOGY
Payer: COMMERCIAL

## 2025-04-02 VITALS
OXYGEN SATURATION: 100 % | HEART RATE: 83 BPM | SYSTOLIC BLOOD PRESSURE: 120 MMHG | RESPIRATION RATE: 18 BRPM | DIASTOLIC BLOOD PRESSURE: 78 MMHG | TEMPERATURE: 98.4 F

## 2025-04-02 PROBLEM — O99.891 BACK PAIN AFFECTING PREGNANCY: Status: ACTIVE | Noted: 2025-04-02

## 2025-04-02 PROBLEM — M54.9 BACK PAIN AFFECTING PREGNANCY: Status: ACTIVE | Noted: 2025-04-02

## 2025-04-02 LAB
BILIRUB UR QL STRIP: NEGATIVE
CLARITY UR: CLEAR
COLOR UR: YELLOW
EPI CELLS #/AREA URNS HPF: 87 /HPF
GLUCOSE UR STRIP-MCNC: NEGATIVE MG/DL
HGB UR QL STRIP.AUTO: NEGATIVE
KETONES UR STRIP-MCNC: NEGATIVE MG/DL
LEUKOCYTE ESTERASE UR QL STRIP: ABNORMAL
NITRITE UR QL STRIP: NEGATIVE
PH UR STRIP: 6.5 [PH] (ref 5–8)
PROT UR STRIP-MCNC: ABNORMAL MG/DL
RBC #/AREA URNS HPF: 6 /HPF (ref 0–2)
SP GR UR STRIP: 1.01 (ref 1–1.03)
UROBILINOGEN UR STRIP-ACNC: 0.2 EU/DL (ref 0–1)
WBC #/AREA URNS HPF: 10 /HPF (ref 0–5)

## 2025-04-02 PROCEDURE — 99213 OFFICE O/P EST LOW 20 MIN: CPT

## 2025-04-02 PROCEDURE — 59025 FETAL NON-STRESS TEST: CPT

## 2025-04-02 PROCEDURE — 81001 URINALYSIS AUTO W/SCOPE: CPT

## 2025-04-02 RX ORDER — ACETAMINOPHEN 500 MG
1000 TABLET ORAL ONCE
Status: DISCONTINUED | OUTPATIENT
Start: 2025-04-02 | End: 2025-04-02 | Stop reason: HOSPADM

## 2025-04-02 NOTE — PROGRESS NOTES
Department of Obstetrics and Gynecology  Labor and Delivery  TRIAGE NOTE      SUBJECTIVE:    Pt presents with On and off back pain and lower abd cramping since . States last night pain got worse and she didn't want to wait until her appointment tomorrow. Denies any lof, bleeding. + FM.      OBJECTIVE    Vitals:  /78   Pulse 83   Temp 98.4 °F (36.9 °C) (Oral)   Resp 18   LMP 2024   SpO2 100%       CONSTITUTIONAL:  negative  RESPIRATORY:  negative  CARDIOVASCULAR:  negative  GASTROINTESTINAL:  negative  ALLERGIC/IMMUNOLOGIC:  negative  NEUROLOGICAL:  negative  BEHAVIOR/PSYCH:  negative    Cervix:             Dilation:     Closed         Effacement:    Long         Station:     -2 cm         Consistency:    firm         Position:     posterior        Membranes:    Intact    Fetal heart rate:        Baseline FHR :    145 bpm              Fetal Accelerations:  present        Fetal Decelerations:  absent        Fetal Variability:   moderate    Contraction frequency:  5 minutes        ASSESSMENT:     19 y.o.  year old  at 37w1d  with 2025, by Last Menstrual Period    Contractions/ back pain   Sve- closed will recheck in 2 hours  Ua sent   PO hydration encouraged  Po tylenol offered   Discussed early s/s of labor and when to return to the hospital     PLAN:  Will discharge pt home pending repeat sve  Follow up in office     I have collaborated and updated Dr Kurtz  and the MD agrees with the current POC.       ARCHIE Fortune CNM

## 2025-04-02 NOTE — FLOWSHEET NOTE
Patient presents to unit with complaints of back pain. Pt shown to LT04 and oriented to room. CCUA obtained. Pt denies vaginal bleeding or LOF. Pt states she is feeling FM. EFM and toco applied. Abd palpates soft and non-tender. Pt states the back pain is an intermittent cramp and that it started Monday. MAI Massey CNM made aware of pt's arrival. Call light within reach.

## 2025-04-03 ENCOUNTER — ROUTINE PRENATAL (OUTPATIENT)
Dept: OBGYN | Age: 20
End: 2025-04-03
Payer: COMMERCIAL

## 2025-04-03 VITALS
BODY MASS INDEX: 27.64 KG/M2 | WEIGHT: 161 LBS | HEART RATE: 89 BPM | DIASTOLIC BLOOD PRESSURE: 77 MMHG | SYSTOLIC BLOOD PRESSURE: 117 MMHG

## 2025-04-03 DIAGNOSIS — Z3A.37 37 WEEKS GESTATION OF PREGNANCY: ICD-10-CM

## 2025-04-03 DIAGNOSIS — O36.63X0 EXCESSIVE FETAL GROWTH AFFECTING MANAGEMENT OF PREGNANCY IN THIRD TRIMESTER, SINGLE OR UNSPECIFIED FETUS: ICD-10-CM

## 2025-04-03 DIAGNOSIS — O09.93 SUPERVISION OF HIGH RISK PREGNANCY IN THIRD TRIMESTER: Primary | ICD-10-CM

## 2025-04-03 PROCEDURE — G8428 CUR MEDS NOT DOCUMENT: HCPCS | Performed by: OBSTETRICS & GYNECOLOGY

## 2025-04-03 PROCEDURE — 99213 OFFICE O/P EST LOW 20 MIN: CPT | Performed by: OBSTETRICS & GYNECOLOGY

## 2025-04-03 PROCEDURE — 36415 COLL VENOUS BLD VENIPUNCTURE: CPT | Performed by: OBSTETRICS & GYNECOLOGY

## 2025-04-03 PROCEDURE — 1036F TOBACCO NON-USER: CPT | Performed by: OBSTETRICS & GYNECOLOGY

## 2025-04-03 PROCEDURE — G8419 CALC BMI OUT NRM PARAM NOF/U: HCPCS | Performed by: OBSTETRICS & GYNECOLOGY

## 2025-04-03 NOTE — PROGRESS NOTES
Return OB Office Visit    CC:   Chief Complaint   Patient presents with    Routine Prenatal Visit     Taking pnv, +fm  No complaints. No b.c. yesterday due to cntx.  Eating, drinking, voiding without difficulty        HPI:  Patient seen and examined. No concerns/complaints. Denies VB, LOF.  intermittent ctx. +Fm.  Denies headaches, vision changes, RUQ pain, increased LE edema.  Denies chest pain, shortness of breath, fever, chills, nausea, vomiting.      Review of Systems: The following ROS was otherwise negative, except as noted in the HPI: constitutional, HEENT, respiratory, cardiovascular, gastrointestinal, genitourinary, skin, musculoskeletal, neurological, psych    Objective:  /77   Pulse 89   Wt 73 kg (161 lb)   LMP 2024   BMI 27.64 kg/m²     Gravid, non tender, no flank pain    FHR: +by doppler    Assessment/Plan:   Kaylah Beltre is a 19 y.o.  at 37w2d who presents for routine OB visit     Diagnosis Orders   1. Supervision of high risk pregnancy in third trimester        2. Excessive fetal growth affecting management of pregnancy in third trimester, single or unspecified fetus  Basic Metabolic Panel    Hemoglobin A1C      3. 37 weeks gestation of pregnancy            Return in about 1 week (around 4/10/2025).    All OB labs and imaging reviewed  Vitamins for the duration of pregnancy  Fkcs  Labor precautions    Severo Kurtz MD

## 2025-04-04 LAB
ANION GAP SERPL CALCULATED.3IONS-SCNC: 12 MMOL/L (ref 3–16)
BUN SERPL-MCNC: 5 MG/DL (ref 7–20)
CALCIUM SERPL-MCNC: 9.2 MG/DL (ref 8.3–10.6)
CHLORIDE SERPL-SCNC: 104 MMOL/L (ref 99–110)
CO2 SERPL-SCNC: 22 MMOL/L (ref 21–32)
CREAT SERPL-MCNC: 0.5 MG/DL (ref 0.6–1.1)
EST. AVERAGE GLUCOSE BLD GHB EST-MCNC: 82.5 MG/DL
GFR SERPLBLD CREATININE-BSD FMLA CKD-EPI: >90 ML/MIN/{1.73_M2}
GLUCOSE SERPL-MCNC: 72 MG/DL (ref 70–99)
HBA1C MFR BLD: 4.5 %
POTASSIUM SERPL-SCNC: 4.2 MMOL/L (ref 3.5–5.1)
SODIUM SERPL-SCNC: 138 MMOL/L (ref 136–145)

## 2025-04-10 ENCOUNTER — ROUTINE PRENATAL (OUTPATIENT)
Dept: OBGYN | Age: 20
End: 2025-04-10
Payer: COMMERCIAL

## 2025-04-10 VITALS
SYSTOLIC BLOOD PRESSURE: 113 MMHG | HEART RATE: 76 BPM | BODY MASS INDEX: 27.67 KG/M2 | DIASTOLIC BLOOD PRESSURE: 77 MMHG | WEIGHT: 161.2 LBS

## 2025-04-10 DIAGNOSIS — O09.93 SUPERVISION OF HIGH RISK PREGNANCY IN THIRD TRIMESTER: Primary | ICD-10-CM

## 2025-04-10 DIAGNOSIS — O36.63X0 EXCESSIVE FETAL GROWTH AFFECTING MANAGEMENT OF PREGNANCY IN THIRD TRIMESTER, SINGLE OR UNSPECIFIED FETUS: ICD-10-CM

## 2025-04-10 DIAGNOSIS — Z3A.38 38 WEEKS GESTATION OF PREGNANCY: ICD-10-CM

## 2025-04-10 PROCEDURE — 1036F TOBACCO NON-USER: CPT | Performed by: OBSTETRICS & GYNECOLOGY

## 2025-04-10 PROCEDURE — 99213 OFFICE O/P EST LOW 20 MIN: CPT | Performed by: OBSTETRICS & GYNECOLOGY

## 2025-04-10 PROCEDURE — G8427 DOCREV CUR MEDS BY ELIG CLIN: HCPCS | Performed by: OBSTETRICS & GYNECOLOGY

## 2025-04-10 PROCEDURE — G8419 CALC BMI OUT NRM PARAM NOF/U: HCPCS | Performed by: OBSTETRICS & GYNECOLOGY

## 2025-04-10 NOTE — PROGRESS NOTES
Return OB Office Visit    CC:   Chief Complaint   Patient presents with    Routine Prenatal Visit     No c/o today.        HPI:  Patient seen and examined. No concerns/complaints. Denies VB, LOF, ctx. Intermittent Fm.  Denies headaches, vision changes, RUQ pain, increased LE edema.  Denies chest pain, shortness of breath, fever, chills, nausea, vomiting.      Review of Systems: The following ROS was otherwise negative, except as noted in the HPI: constitutional, HEENT, respiratory, cardiovascular, gastrointestinal, genitourinary, skin, musculoskeletal, neurological, psych    Objective:  /77   Pulse 76   Wt 73.1 kg (161 lb 3.2 oz)   LMP 2024   BMI 27.67 kg/m²     Gravid, non tender, no flank pain    FHR: +by doppler    Assessment/Plan:   Kaylah Beltre is a 19 y.o.  at 38w2d who presents for routine OB visit     Diagnosis Orders   1. Supervision of high risk pregnancy in third trimester        2. Excessive fetal growth affecting management of pregnancy in third trimester, single or unspecified fetus        3. 38 weeks gestation of pregnancy          [facility]  Labor precautions  Induction scheduled 39 + weeks, risk reducing due to LGA  Return in about 6 weeks (around 2025).    All OB labs and imaging reviewed  Vitamins for the duration of pregnancy      Severo Kurtz MD